# Patient Record
Sex: FEMALE | Race: WHITE
[De-identification: names, ages, dates, MRNs, and addresses within clinical notes are randomized per-mention and may not be internally consistent; named-entity substitution may affect disease eponyms.]

---

## 2021-03-24 ENCOUNTER — HOSPITAL ENCOUNTER (EMERGENCY)
Dept: HOSPITAL 56 - MW.ED | Age: 51
Discharge: HOME | End: 2021-03-24
Payer: COMMERCIAL

## 2021-03-24 DIAGNOSIS — T15.92XA: Primary | ICD-10-CM

## 2021-03-24 PROCEDURE — 65220 REMOVE FOREIGN BODY FROM EYE: CPT

## 2021-03-24 PROCEDURE — 99283 EMERGENCY DEPT VISIT LOW MDM: CPT

## 2021-03-24 NOTE — EDM.PDOC
ED HPI GENERAL MEDICAL PROBLEM





- General


Chief Complaint: Eye Problems


Stated Complaint: SOMETHING STUCK IN LT EYE


Time Seen by Provider: 03/24/21 21:55





- History of Present Illness


INITIAL COMMENTS - FREE TEXT/NARRATIVE: 





HISTORY AND PHYSICAL:





History of present illness:


This is a 51-year-old female who presents ER today secondary to foreign body 

sensation identified to her left eye.  Patient's  reports he is able to v

isualize a foreign body at approximately the 5 o'clock position in her left 

iris.  Patient denies any change in vision.  Patient denies any recent fevers, 

shakes, chills, nausea, vomiting, diarrhea.  Patient reports that she was just 

walking outside when she felt something fly into her eye and has been having 

pain and discomfort since.





Review of systems: 


As per history of present illness and below otherwise all systems reviewed and 

negative.





Past medical history: 


As per history of present illness and as reviewed below otherwise 

noncontributory.





Surgical history: 


As per history of present illness and as reviewed below otherwise 

noncontributory.





Social history: 


No reported history of drug or alcohol abuse.





Family history: 


As per history of present illness and as reviewed below otherwise 

noncontributory.





Physical exam:





This patient was seen and evaluated during the 2020 SARS-CoV-2 novel coronavirus

pandemic period.  Community viral transmission is ongoing at time of this enc

nter and the emergency department is operating under pandemic response 

procedures.





Constitutional: Patient is oriented to person, place, and time.  Appears well-

developed and well-nourished.  No distress.


 HEENT: Moist mucous membranes


 Head: Normocephalic and atraumatic


 Eyes: Right eye exhibits no discharge.  Left eye exhibits no discharge.  No 

scleral icterus


 Neck: Normal range of motion.  No tracheal deviation present.


 Cardiovascular: Normal rate and regular rhythm.


 Pulmonary: Effort normal, no respiratory distress.


 Abdominal: No distention


 Musculoskeletal: Normal range of motion


 Neurologic: Alert and oriented to person, place and time.


 Skin: Pink, warm and dry.  


 Psychiatric: Normal mood and affect.  Behavior is normal.  Judgment and thought

content normal.


 Nursing note and vital signs have been reviewed





LIDS & LASHES:  Normal.


PUPILS:  Pupils equal and reactive.


EOM's:  Intact.


LID EVERSION:  No  foreign body.


CONJUNCTIVAE:  No conjunctival injection


CORNEA: Foreign body identified at the 5 o'clock position left iris.


ANTERIOR CHAMBER: No foreign body, No tear in iris,No hyphema














Diagnostics:


Utilizing a slit lamp a foreign body was identified at approximately 5 o'clock 

position in her left iris.


Visual acuity noted





Therapeutics:


3 drops of tetracaine were instilled into her left eye with complete relief in 

her pain and discomfort.  Utilizing an 18-gauge curved needle, the foreign body 

was easily removed and scraped off the cornea.  Patient tolerated procedure 

well.





Assessment and plan:


51-year-old female who presents ER today with foreign body to her left iris.  

Foreign body has been removed.  Patient be started on erythromycin ointment to 

be taken twice a day for 3 days.  Patient will be expected to follow-up with her

eye doctor as needed.  Patient is declining tetanus shot.  Patient is unsure 

when her last tetanus shot was.  Patient will be instructed to take ibuprofen 

600 mg as needed for pain.








Definitive disposition and diagnosis as appropriate pending reevaluation and 

review of above.








  ** Left Eye


Pain Score (Numeric/FACES): 1





- Related Data


                                    Allergies











Allergy/AdvReac Type Severity Reaction Status Date / Time


 


No Known Allergies Allergy   Verified 03/24/21 21:46











Home Meds: 


                                    Home Meds





. [No Known Home Meds]  03/24/21 [History]











Past Medical History





- Past Health History


Medical/Surgical History: Denies Medical/Surgical History





- Infectious Disease History


Infectious Disease History: Reports: None





Social & Family History





- Tobacco Use


Tobacco Use Status *Q: Never Tobacco User





- Caffeine Use


Caffeine Use: Reports: None





- Recreational Drug Use


Recreational Drug Use: No





ED ROS GENERAL





- Review of Systems


Review Of Systems: See Below





ED EXAM GENERAL W FULL EYE





- Physical Exam


Exam: See Below





Course





- Vital Signs


Last Recorded V/S: 


                                Last Vital Signs











Temp  97 F   03/24/21 21:47


 


Pulse  82   03/24/21 21:47


 


Resp  16   03/24/21 21:47


 


BP  129/84   03/24/21 21:47


 


Pulse Ox  97   03/24/21 21:47














- Orders/Labs/Meds


Meds: 


Medications














Discontinued Medications














Generic Name Dose Route Start Last Admin





  Trade Name Freq  PRN Reason Stop Dose Admin


 


Erythromycin  1 gm  03/24/21 22:20  03/24/21 22:21





  Erythromycin Base 0.5% Ophth Oint 1 Gm Tube  EYEBOTH  03/24/21 22:21  1 applic





  ONETIME ONE   Administration


 


Erythromycin  Confirm  03/24/21 22:20  03/24/21 22:23





  Erythromycin Base 0.5% Ophth Oint 1 Gm Tube  Administered  03/24/21 22:21  Not

 Given





  Dose  





  1 gm  





  .ROUTE  





  .STK-MED ONE  


 


Tetracaine HCl  1 ml  03/24/21 22:11  03/24/21 22:14





  Tetracaine Hcl/Pf 0.5% 4 Ml Bottle  OP  03/24/21 22:12  1 applic





  ASDIRECTED ONE   Administration


 


Tetracaine HCl  Confirm  03/24/21 22:09  03/24/21 22:14





  Tetracaine Hcl/Pf 0.5% 4 Ml Bottle  Administered  03/24/21 22:10  Not Given





  Dose  





  4 ml  





  .ROUTE  





  .STK-MED ONE  














Departure





- Departure


Time of Disposition: 22:23


Disposition: Home, Self-Care 01


Condition: Good


Clinical Impression: 


Foreign body of left eye


Qualifiers:


 Encounter type: initial encounter Qualified Code(s): T15.92XA - Foreign body on

 external eye, part unspecified, left eye, initial encounter








- Discharge Information


Instructions:  Eye Foreign Body, Easy-to-Read


Referrals: 


PCP,None [Primary Care Provider] - 


Forms:  ED Department Discharge


Additional Instructions: 


You have been seen and evaluated in the ER today secondary to foreign body to 

your left eyeball.  The foreign body has been successfully removed.  You will be

given erythromycin ointment to apply twice a day for 3 days.  Please make an 

appointment to see your eye doctor if you have any redness, swelling, pain, 

change in vision or other new or concerning symptoms.





Please talk to your family doctor about options regarding your tetanus status.





The following information is given to patients seen in the emergency department 

who are being discharged to home. This information is to outline your options 

for follow-up care. We provide all patients seen in our emergency department 

with a follow-up referral.





The need for follow-up, as well as the timing and circumstances, are variable 

depending upon the specifics of your emergency department visit.





If you don't have a primary care physician on staff, we will provide you with a 

referral. We always advise you to contact your personal physician following an 

emergency department visit to inform them of the circumstance of the visit and 

for follow-up with them and/or the need for any referrals to a consulting 

specialist.





The emergency department will also refer you to a specialist when appropriate. 

This referral assures that you have the opportunity for follow-up care with a 

specialist. All of these measure are taken in an effort to provide you with 

optimal care, which includes your follow-up.





Under all circumstances we always encourage you to contact your private 

physician who remains a resource for coordinating your care. When calling for 

follow-up care, please make the office aware that this follow-up is from your 

recent emergency room visit. If for any reason you are refused follow-up, please

contact the [facility] Emergency Department

at [phone] and asked to speak to the emergency department charge nurse.





[facility]


[address] [place] [id number]


Phone: [phone]


Fax: [phone]








[facility]


[address] [place] [address]


Phone: [phone]


Fax: [phone]








Sepsis Event Note (ED)





- Evaluation


Sepsis Screening Result: No Definite Risk





- Focused Exam


Vital Signs: 


                                   Vital Signs











  Temp Pulse Resp BP Pulse Ox


 


 03/24/21 21:47  97 F  82  16  129/84  97